# Patient Record
Sex: FEMALE | Race: ASIAN | NOT HISPANIC OR LATINO
[De-identification: names, ages, dates, MRNs, and addresses within clinical notes are randomized per-mention and may not be internally consistent; named-entity substitution may affect disease eponyms.]

---

## 2017-11-07 ENCOUNTER — APPOINTMENT (OUTPATIENT)
Dept: OBGYN | Facility: CLINIC | Age: 52
End: 2017-11-07
Payer: COMMERCIAL

## 2017-11-07 VITALS
HEIGHT: 63 IN | BODY MASS INDEX: 23.92 KG/M2 | WEIGHT: 135 LBS | DIASTOLIC BLOOD PRESSURE: 79 MMHG | HEART RATE: 80 BPM | SYSTOLIC BLOOD PRESSURE: 120 MMHG

## 2017-11-07 DIAGNOSIS — Z86.79 PERSONAL HISTORY OF OTHER DISEASES OF THE CIRCULATORY SYSTEM: ICD-10-CM

## 2017-11-07 DIAGNOSIS — Z78.9 OTHER SPECIFIED HEALTH STATUS: ICD-10-CM

## 2017-11-07 PROCEDURE — 99396 PREV VISIT EST AGE 40-64: CPT

## 2017-11-07 RX ORDER — CHROMIUM 200 MCG
TABLET ORAL
Refills: 0 | Status: ACTIVE | COMMUNITY

## 2018-02-22 ENCOUNTER — APPOINTMENT (OUTPATIENT)
Dept: OBGYN | Facility: CLINIC | Age: 53
End: 2018-02-22
Payer: COMMERCIAL

## 2018-02-22 VITALS
WEIGHT: 136.31 LBS | DIASTOLIC BLOOD PRESSURE: 87 MMHG | SYSTOLIC BLOOD PRESSURE: 154 MMHG | BODY MASS INDEX: 24.15 KG/M2 | HEART RATE: 84 BPM | HEIGHT: 63 IN

## 2018-02-22 VITALS — DIASTOLIC BLOOD PRESSURE: 78 MMHG | SYSTOLIC BLOOD PRESSURE: 125 MMHG

## 2018-02-22 PROCEDURE — 99213 OFFICE O/P EST LOW 20 MIN: CPT

## 2018-02-26 ENCOUNTER — OTHER (OUTPATIENT)
Age: 53
End: 2018-02-26

## 2018-02-26 LAB
CANDIDA VAG CYTO: NOT DETECTED
G VAGINALIS+PREV SP MTYP VAG QL MICRO: DETECTED
T VAGINALIS VAG QL WET PREP: NOT DETECTED

## 2018-03-02 ENCOUNTER — APPOINTMENT (OUTPATIENT)
Dept: OBGYN | Facility: CLINIC | Age: 53
End: 2018-03-02
Payer: COMMERCIAL

## 2018-03-02 ENCOUNTER — ASOB RESULT (OUTPATIENT)
Age: 53
End: 2018-03-02

## 2018-03-02 PROCEDURE — 76830 TRANSVAGINAL US NON-OB: CPT

## 2018-03-06 ENCOUNTER — OTHER (OUTPATIENT)
Age: 53
End: 2018-03-06

## 2018-03-06 LAB
C TRACH RRNA SPEC QL NAA+PROBE: NOT DETECTED
ESTRADIOL SERPL-MCNC: <5 PG/ML
FSH SERPL-MCNC: 95.9 IU/L
HCG SERPL-MCNC: <1 MIU/ML
N GONORRHOEA RRNA SPEC QL NAA+PROBE: NOT DETECTED
SOURCE AMPLIFICATION: NORMAL
T4 FREE SERPL-MCNC: 1.1 NG/DL
TSH SERPL-ACNC: 1.54 UIU/ML

## 2018-04-12 ENCOUNTER — TRANSCRIPTION ENCOUNTER (OUTPATIENT)
Age: 53
End: 2018-04-12

## 2018-07-25 PROBLEM — Z78.9 ALCOHOL USE: Status: INACTIVE | Noted: 2017-11-07

## 2018-09-13 ENCOUNTER — APPOINTMENT (OUTPATIENT)
Dept: OBGYN | Facility: CLINIC | Age: 53
End: 2018-09-13
Payer: COMMERCIAL

## 2018-09-13 VITALS
HEIGHT: 63 IN | DIASTOLIC BLOOD PRESSURE: 74 MMHG | BODY MASS INDEX: 23.39 KG/M2 | HEART RATE: 80 BPM | WEIGHT: 132 LBS | SYSTOLIC BLOOD PRESSURE: 122 MMHG

## 2018-09-13 DIAGNOSIS — Z01.419 ENCOUNTER FOR GYNECOLOGICAL EXAMINATION (GENERAL) (ROUTINE) W/OUT ABNORMAL FINDINGS: ICD-10-CM

## 2018-09-13 PROCEDURE — 99396 PREV VISIT EST AGE 40-64: CPT

## 2018-09-13 RX ORDER — FLUTICASONE PROPIONATE 50 UG/1
50 SPRAY, METERED NASAL
Qty: 16 | Refills: 0 | Status: DISCONTINUED | COMMUNITY
Start: 2018-04-12

## 2018-09-16 LAB — HPV HIGH+LOW RISK DNA PNL CVX: NOT DETECTED

## 2018-11-06 LAB — CYTOLOGY CVX/VAG DOC THIN PREP: NORMAL

## 2019-02-09 ENCOUNTER — TRANSCRIPTION ENCOUNTER (OUTPATIENT)
Age: 54
End: 2019-02-09

## 2019-11-23 ENCOUNTER — TRANSCRIPTION ENCOUNTER (OUTPATIENT)
Age: 54
End: 2019-11-23

## 2020-06-12 ENCOUNTER — TRANSCRIPTION ENCOUNTER (OUTPATIENT)
Age: 55
End: 2020-06-12

## 2021-09-14 ENCOUNTER — TRANSCRIPTION ENCOUNTER (OUTPATIENT)
Age: 56
End: 2021-09-14

## 2022-03-10 ENCOUNTER — NON-APPOINTMENT (OUTPATIENT)
Age: 57
End: 2022-03-10

## 2022-03-11 ENCOUNTER — NON-APPOINTMENT (OUTPATIENT)
Age: 57
End: 2022-03-11

## 2022-03-11 ENCOUNTER — APPOINTMENT (OUTPATIENT)
Dept: OBGYN | Facility: CLINIC | Age: 57
End: 2022-03-11
Payer: COMMERCIAL

## 2022-03-11 VITALS
HEART RATE: 80 BPM | HEIGHT: 63 IN | WEIGHT: 134 LBS | BODY MASS INDEX: 23.74 KG/M2 | DIASTOLIC BLOOD PRESSURE: 79 MMHG | SYSTOLIC BLOOD PRESSURE: 131 MMHG

## 2022-03-11 DIAGNOSIS — R10.9 UNSPECIFIED ABDOMINAL PAIN: ICD-10-CM

## 2022-03-11 PROCEDURE — 99386 PREV VISIT NEW AGE 40-64: CPT

## 2022-03-11 NOTE — PLAN
[FreeTextEntry1] : 55 y/o P2 postmenopausal female presents for annual exam and with complaints of dull pain on lower right side. \par \par HCM\par -Rx mammo\par -Pap done today\par \par pain\par - ucx\par - pelvic sono \par

## 2022-03-11 NOTE — END OF VISIT
[FreeTextEntry3] : I, Yenny Tee, acted as a scribe on behalf of Dr. Fernando Subramanian on 03/11/2022.\par \par All medical entries made by the scribe were at my,Dr. Fernando Subramanian, direction and personally dictated by me on 03/11/2022 . I have reviewed the chart and agree that the record accurately reflects my personal performance of the history, physical exam, assessment and plan. I have also personally directed, reviewed, and agreed with the chart.

## 2022-03-11 NOTE — HISTORY OF PRESENT ILLNESS
[FreeTextEntry1] : 55 y/o P2 postmenopausal female presents for annual exam with complaints of dull pain on lower right side. Pt states last week had sharp pain on right side. Now she has dull constant pain on lower rmid quadrant side. Denies fever, chill, nausea, vomiting. Denies abn vb, discharge. Pt not sexually active. Bone density 2019 revealed osteopenia. Colonoscopy 5 years ago. 01/2019 mammo negative.\par \par 03/2018 TV sono revealed 7.8 x 5.8 x 4.3 cm retroverted fibroid uterus. Thin endometrium (2.94),  No abnormal adnexal mass or fluid collection is identified. Left ovary contains one small antral follicle\par \par MedHx: Simvastatin 5mg, aspirin 81mg, vit d 2000iu, ubiquinol 100mg\par SHx: appendicitis\par \par \par \par

## 2022-03-14 LAB
BACTERIA UR CULT: NORMAL
HPV HIGH+LOW RISK DNA PNL CVX: NOT DETECTED

## 2022-03-17 LAB — CYTOLOGY CVX/VAG DOC THIN PREP: ABNORMAL

## 2023-05-03 ENCOUNTER — NON-APPOINTMENT (OUTPATIENT)
Age: 58
End: 2023-05-03

## 2023-05-04 ENCOUNTER — APPOINTMENT (OUTPATIENT)
Dept: OBGYN | Facility: CLINIC | Age: 58
End: 2023-05-04
Payer: COMMERCIAL

## 2023-05-04 VITALS
HEIGHT: 63 IN | SYSTOLIC BLOOD PRESSURE: 126 MMHG | DIASTOLIC BLOOD PRESSURE: 73 MMHG | BODY MASS INDEX: 23.57 KG/M2 | HEART RATE: 76 BPM | WEIGHT: 133 LBS

## 2023-05-04 PROCEDURE — 99396 PREV VISIT EST AGE 40-64: CPT

## 2023-06-06 ENCOUNTER — APPOINTMENT (OUTPATIENT)
Dept: INFECTIOUS DISEASE | Facility: CLINIC | Age: 58
End: 2023-06-06
Payer: SELF-PAY

## 2023-06-06 DIAGNOSIS — Z71.84 ENC FOR HEALTH COUNSELING RELATED TO TRAVEL: ICD-10-CM

## 2023-06-06 PROCEDURE — 99401 PREV MED CNSL INDIV APPRX 15: CPT

## 2023-06-06 RX ORDER — AZITHROMYCIN 500 MG/1
500 TABLET, FILM COATED ORAL
Qty: 3 | Refills: 0 | Status: ACTIVE | COMMUNITY
Start: 2023-06-06 | End: 1900-01-01

## 2023-06-16 ENCOUNTER — APPOINTMENT (OUTPATIENT)
Dept: INFECTIOUS DISEASE | Facility: CLINIC | Age: 58
End: 2023-06-16
Payer: SELF-PAY

## 2023-06-16 PROCEDURE — 90691 TYPHOID VACCINE IM: CPT

## 2023-06-16 PROCEDURE — 90471 IMMUNIZATION ADMIN: CPT | Mod: NC

## 2023-07-28 RX ORDER — SIMVASTATIN 80 MG/1
TABLET, FILM COATED ORAL
Refills: 0 | Status: ACTIVE | COMMUNITY

## 2023-12-11 ENCOUNTER — APPOINTMENT (OUTPATIENT)
Dept: ORTHOPEDIC SURGERY | Facility: CLINIC | Age: 58
End: 2023-12-11
Payer: COMMERCIAL

## 2023-12-11 VITALS — WEIGHT: 134 LBS | BODY MASS INDEX: 23.74 KG/M2 | HEIGHT: 63 IN

## 2023-12-11 DIAGNOSIS — M25.552 PAIN IN LEFT HIP: ICD-10-CM

## 2023-12-11 PROCEDURE — 73502 X-RAY EXAM HIP UNI 2-3 VIEWS: CPT | Mod: LT

## 2023-12-11 PROCEDURE — 99203 OFFICE O/P NEW LOW 30 MIN: CPT

## 2023-12-27 PROBLEM — M25.552 HIP PAIN, LEFT: Status: ACTIVE | Noted: 2023-12-27

## 2023-12-27 NOTE — HISTORY OF PRESENT ILLNESS
[de-identified] : 58 year old female presents today with left hip pain x 1 week. Patient noticed pain after doing yoga. She rested after for one week was doing well until this past Friday when she had return of pain. Patient noticed return of pain after walking and has been getting progressively worse. The pain is constant worse with walking and stairs. Patient has been limping from the pain which has flares up her sciatica. Takes Aspirin 81mg following cardiac stent.  Denies groin.   The patient's past medical history, past surgical history, medications and allergies were reviewed by me today with the patient and documented accordingly. In addition, the patient's family and social history, which were noncontributory to this visit, were reviewed also.

## 2023-12-27 NOTE — ADDENDUM
[FreeTextEntry1] : This note was written by Carla Genao on 12/11/2023 acting solely as a scribe for Dr. Miquel Solis.  All medical record entries made by the Scribe were at my, Dr. Miquel Solis, direction and personally dictated by me on 12/11/2023. I have personally reviewed the chart and agree that the record accurately reflects my personal performance of the history, physical exam, assessment and plan.

## 2023-12-27 NOTE — PHYSICAL EXAM
[de-identified] : Left Hip Exam:   Skin: Clean, dry, intact Inspection: No obvious deformity, no swelling. Pulses: 2+ DP/PT pulses ROM: 120flexion. Internal rotation to 40. External rotation to 40. Painful ROM: None, No groin pain. Tenderness: No tenderness over greater trochanter. + tenderness at gluteal insertion. No paraspinal tenderness. No axial lumbar tenderness. No sacroiliac tenderness. No TTP over the ASIS/Iliac crest. Strength: 5/5 hip flexion, 5/5 gluteal strength, 5/5 hip ADD, 5/5 hip ABD, 5/5 Q/H, 5/5 TA/GS/EHL Neuro: Sensation intact to light touch throughout, DTR normal Additional tests: Negative impingement test, Negative STEW Other findings: None. [de-identified] : The following radiographs were ordered and read by me during this patients visit. I reviewed each radiograph in detail with the patient and discussed the findings as highlighted below.   AP pelvis and lateral view of the left hip were obtained today, 12/11/2023, that show no acute bony injury, including fracture or dislocation. There is no hip degenerative change seen. There is no gross pelvic of hip malalignment. No significant other obvious osseous abnormality, otherwise unremarkable.

## 2023-12-27 NOTE — DISCUSSION/SUMMARY
[de-identified] : 57 y/o female with left hip peritrochanteric pain.   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 Discussed with the patient the likely causes of the discomfort including greater trochanteric bursitis, iliotibial band irritation, hip abduction dysfunction/gluteal tendinopathy. There is intermittent evidence of sciatic nerve irritation/radicular pain.Treatment for this syndrome typically includes conservative/nonoperative management including hip/low strengthening and stretching, NSAIDs, and physical therapy modalities with possible use of corticosteroid injection on an as-needed basis. The patient fails prolonged conservative management, operative intervention may be warranted.  Recommendation: Conservative management as outlined above. Begin trial of PT, Rx given.  Follow-up as needed.

## 2024-01-08 DIAGNOSIS — Z12.39 ENCOUNTER FOR OTHER SCREENING FOR MALIGNANT NEOPLASM OF BREAST: ICD-10-CM

## 2024-04-18 ENCOUNTER — TRANSCRIPTION ENCOUNTER (OUTPATIENT)
Age: 59
End: 2024-04-18

## 2024-05-22 ENCOUNTER — APPOINTMENT (OUTPATIENT)
Dept: OBGYN | Facility: CLINIC | Age: 59
End: 2024-05-22
Payer: COMMERCIAL

## 2024-05-22 VITALS
DIASTOLIC BLOOD PRESSURE: 72 MMHG | SYSTOLIC BLOOD PRESSURE: 110 MMHG | BODY MASS INDEX: 24.45 KG/M2 | WEIGHT: 138 LBS | HEART RATE: 76 BPM | HEIGHT: 63 IN

## 2024-05-22 DIAGNOSIS — Z01.419 ENCOUNTER FOR GYNECOLOGICAL EXAMINATION (GENERAL) (ROUTINE) W/OUT ABNORMAL FINDINGS: ICD-10-CM

## 2024-05-22 DIAGNOSIS — N95.2 POSTMENOPAUSAL ATROPHIC VAGINITIS: ICD-10-CM

## 2024-05-22 PROCEDURE — 99396 PREV VISIT EST AGE 40-64: CPT

## 2024-05-22 RX ORDER — ESTRADIOL 10 UG/1
10 TABLET, FILM COATED VAGINAL
Qty: 24 | Refills: 3 | Status: ACTIVE | COMMUNITY
Start: 2024-05-22 | End: 1900-01-01

## 2024-05-23 NOTE — PHYSICAL EXAM
[Chaperone Present] : A chaperone was present in the examining room during all aspects of the physical examination [[id number]] : A chaperone was present during the pelvic exam. [FreeTextEntry2] : gayle [Appropriately responsive] : appropriately responsive [Alert] : alert [No Acute Distress] : no acute distress [No Lymphadenopathy] : no lymphadenopathy [Thyroid Nodule] : thyroid nodule [Soft] : soft [Non-tender] : non-tender [Non-distended] : non-distended [No HSM] : No HSM [No Lesions] : no lesions [No Mass] : no mass [Oriented x3] : oriented x3 [Examination Of The Breasts] : a normal appearance [No Masses] : no breast masses were palpable [Labia Minora] : normal [Labia Majora] : normal [Normal] : normal [Uterine Adnexae] : normal

## 2024-05-23 NOTE — HISTORY OF PRESENT ILLNESS
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 +thyroid nodules- followed by endo Patient is not sexually active because of issues with  [Mammogramdate] : 2019 [PapSmeardate] : 3/2022 [ColonoscopyDate] : 2023 [PGHxTotal] : 4 [[facility] : 2 [PGHxABSpont] : 2

## 2024-05-23 NOTE — PLAN
[FreeTextEntry1] : Patient desires to try vaginal estrogen for dryness and worried about potential for bladder issues

## 2024-05-30 ENCOUNTER — NON-APPOINTMENT (OUTPATIENT)
Age: 59
End: 2024-05-30